# Patient Record
Sex: MALE | Race: BLACK OR AFRICAN AMERICAN | URBAN - METROPOLITAN AREA
[De-identification: names, ages, dates, MRNs, and addresses within clinical notes are randomized per-mention and may not be internally consistent; named-entity substitution may affect disease eponyms.]

---

## 2019-07-22 ENCOUNTER — HISTORICAL (OUTPATIENT)
Dept: ADMINISTRATIVE | Facility: HOSPITAL | Age: 79
End: 2019-07-22

## 2019-07-22 LAB — PSA SERPL-MCNC: 190 NG/ML

## 2019-08-13 ENCOUNTER — HISTORICAL (OUTPATIENT)
Dept: ENDOSCOPY | Facility: HOSPITAL | Age: 79
End: 2019-08-13

## 2019-08-30 ENCOUNTER — HISTORICAL (OUTPATIENT)
Dept: RADIOLOGY | Facility: HOSPITAL | Age: 79
End: 2019-08-30

## 2019-08-30 LAB
BUN SERPL-MCNC: 14 MG/DL (ref 7–18)
CALCIUM SERPL-MCNC: 8.7 MG/DL (ref 8.5–10.1)
CHLORIDE SERPL-SCNC: 112 MMOL/L (ref 98–107)
CO2 SERPL-SCNC: 29 MMOL/L (ref 21–32)
CREAT SERPL-MCNC: 1.1 MG/DL (ref 0.6–1.3)
CREAT/UREA NIT SERPL: 13
GLUCOSE SERPL-MCNC: 82 MG/DL (ref 74–106)
POTASSIUM SERPL-SCNC: 3.8 MMOL/L (ref 3.5–5.1)
SODIUM SERPL-SCNC: 146 MMOL/L (ref 136–145)

## 2020-03-09 ENCOUNTER — HISTORICAL (OUTPATIENT)
Dept: ADMINISTRATIVE | Facility: HOSPITAL | Age: 80
End: 2020-03-09

## 2020-03-09 LAB — PSA SERPL-MCNC: 5.1 NG/ML

## 2021-03-22 ENCOUNTER — HISTORICAL (OUTPATIENT)
Dept: ADMINISTRATIVE | Facility: HOSPITAL | Age: 81
End: 2021-03-22

## 2021-03-22 LAB — PSA SERPL-MCNC: 10.27 NG/ML

## 2021-10-18 ENCOUNTER — HISTORICAL (OUTPATIENT)
Dept: ADMINISTRATIVE | Facility: HOSPITAL | Age: 81
End: 2021-10-18

## 2021-10-18 LAB — POC CREATININE: 1 MG/DL (ref 0.6–1.3)

## 2022-04-11 ENCOUNTER — HISTORICAL (OUTPATIENT)
Dept: ADMINISTRATIVE | Facility: HOSPITAL | Age: 82
End: 2022-04-11

## 2022-04-29 VITALS
WEIGHT: 164.44 LBS | BODY MASS INDEX: 24.36 KG/M2 | HEIGHT: 69 IN | DIASTOLIC BLOOD PRESSURE: 85 MMHG | SYSTOLIC BLOOD PRESSURE: 147 MMHG | OXYGEN SATURATION: 98 %

## 2022-04-30 NOTE — OP NOTE
DATE OF SURGERY:    08/13/2019    SURGEON:  Rogelio Ventura MD    attending physician:  Rogelio Ventura MD    PREOPERATIVE DIAGNOSES:    1. Elevated prostate-specific antigen.  2. Urinary retention.    POSTOPERATIVE DIAGNOSES:    1. Elevated prostate-specific antigen.  2. Urinary retention.    PROCEDURE:    1. Prostate ultrasound.  2. Ultrasound-guided biopsy.  3. Prostate biopsy.    ANESTHESIA:  General.    COMPLICATIONS:  None.    BLOOD LOSS:  Minimal.    FINDINGS:  A 43 cc gland with 2 cm nodule, left base anteriorly.  He tolerated procedure well.    INDICATIONS:  A 79-year-old male with urinary retention.  He has failed several voiding trials.  His PSA, however, is 190.  Certainly there is concern for prostate cancer.  He comes in today for biopsy.  He did receive a week of preoperative cephalosporin antibiotic therapy due to E coli colonization.     Risks and benefits of the procedure discussed, including bleeding, infection, failure to diagnose, recurrence.  The family is well aware that with the catheter, he is at increased risk for infection and if he develops fever afterwards, then he will need IV antibiotic therapy.    DESCRIPTION OF PROCEDURE:  Informed consent was obtained.  Taken to the ultrasound suite.  He underwent general anesthesia.  He was placed with the right flank up.  8 MHz ultrasound probe was inserted into his rectal vault.  His prostate measured 43 cc mid gland.  Seminal vesicles were nondilated.  Viewing transverse, coronal images, there was a 2 cm lesion, well-circumscribed coming off the base anteriorly.  The remainder of the prostate architecture was unremarkable.  Sagittal sectioning was carried out. He underwent biopsies in sextant fashion.  The 2 left based cores did include the nodular area.  Sextant biopsies were taken in each base mid and apex bilaterally.  He tolerated the procedure well.  There was no bleeding.  He was awakened, brought back to daytime surgery in  stable condition.  Again, the family is aware should he have temperature of 100, he has to come back to emergency room with IV antibiotic therapy administration.  I will see him back in 1 week, sooner should he have any problems.  A prescription for Omnicef was called out to [facility] pharmacy in [place].        ______________________________  Rogelio Ventura MD    TAB/MODL  DD:  08/13/2019  Time:  11:40AM  DT:  08/13/2019  Time:  11:58AM  Job #:  [id number]

## 2024-07-03 DIAGNOSIS — R97.20 ELEVATED PSA: Primary | ICD-10-CM
